# Patient Record
Sex: MALE | URBAN - METROPOLITAN AREA
[De-identification: names, ages, dates, MRNs, and addresses within clinical notes are randomized per-mention and may not be internally consistent; named-entity substitution may affect disease eponyms.]

---

## 2022-09-11 ENCOUNTER — OFFICE VISIT (OUTPATIENT)
Dept: URBAN - METROPOLITAN AREA CLINIC 71 | Facility: CLINIC | Age: 22
End: 2022-09-11
Payer: COMMERCIAL

## 2022-09-11 DIAGNOSIS — T15.01XA FOREIGN BODY IN CORNEA, RIGHT EYE, INITIAL ENCOUNTER: Primary | ICD-10-CM

## 2022-09-11 PROCEDURE — 99204 OFFICE O/P NEW MOD 45 MIN: CPT | Performed by: OPTOMETRIST

## 2022-09-11 RX ORDER — OFLOXACIN 3 MG/ML
0.3 % SOLUTION/ DROPS OPHTHALMIC
Qty: 5 | Refills: 0 | Status: ACTIVE
Start: 2022-09-11

## 2022-09-11 NOTE — IMPRESSION/PLAN
Impression: Foreign body in cornea, right eye, initial encounter: T15.01xA. Metallic FB. Infiltrate. central. Vision affected significantly due to corneal edema/haze. Plan: Pt requested removal. Removed FB and rust ring in Slit lamp with spud and Marcial brush. Instilled 1gtt Altafluor before removal, instilled 1gtt 1% cyclo and 1gtt oflox after FB removal. Pt tolerated procedure well. No complications. Advised that vision will likely be permanently affected due to scarring and large central locale of corneal injury. Discussed topical steroid therapy in the future to reduce scarring, PRK in future if topical steroids fail. Gave pt option of pressure patch, bandage CL, or self patching with ingrid. Pt and parents elected self patching PRN. Pt to call with concerns.

## 2022-09-13 ENCOUNTER — OFFICE VISIT (OUTPATIENT)
Dept: URBAN - METROPOLITAN AREA CLINIC 71 | Facility: CLINIC | Age: 22
End: 2022-09-13
Payer: COMMERCIAL

## 2022-09-13 DIAGNOSIS — T15.01XD FOREIGN BODY IN CORNEA, RIGHT EYE, SUBSEQUENT ENCOUNTER: Primary | ICD-10-CM

## 2022-09-13 PROCEDURE — 99212 OFFICE O/P EST SF 10 MIN: CPT

## 2022-09-13 ASSESSMENT — INTRAOCULAR PRESSURE
OD: 12
OS: 13

## 2022-09-15 ENCOUNTER — OFFICE VISIT (OUTPATIENT)
Dept: URBAN - METROPOLITAN AREA CLINIC 71 | Facility: CLINIC | Age: 22
End: 2022-09-15
Payer: COMMERCIAL

## 2022-09-15 PROCEDURE — 99212 OFFICE O/P EST SF 10 MIN: CPT

## 2022-09-15 ASSESSMENT — INTRAOCULAR PRESSURE
OD: 10
OS: 12

## 2022-09-15 NOTE — IMPRESSION/PLAN
Impression: Foreign body in cornea, right eye, subsequent encounter: T15.01XD. - Metallic FB. Infiltrate. central. Vision improving. Plan: Will have patient continue taking Ofloxacin 6x daily for 2 more day and will follow up on Saturday and potentially start steroid gtts at that time.  Patient voices understanding

## 2022-09-17 ENCOUNTER — OFFICE VISIT (OUTPATIENT)
Dept: URBAN - METROPOLITAN AREA CLINIC 71 | Facility: CLINIC | Age: 22
End: 2022-09-17
Payer: COMMERCIAL

## 2022-09-17 PROCEDURE — 99212 OFFICE O/P EST SF 10 MIN: CPT

## 2022-09-17 RX ORDER — PREDNISOLONE ACETATE 10 MG/ML
1 % SUSPENSION/ DROPS OPHTHALMIC
Qty: 5 | Refills: 0 | Status: ACTIVE
Start: 2022-09-17

## 2022-09-17 ASSESSMENT — INTRAOCULAR PRESSURE
OS: 14
OD: 11

## 2022-09-17 NOTE — IMPRESSION/PLAN
Impression: Foreign body in cornea, right eye, subsequent encounter: T15.01XD. Metallic foreign body removed 4 days ago Pt is showing improvement  Plan: Discussed today's findings with pt Explained that infiltrate and defect is improving but stromal haze starting to opacify. Recommend pt to decrease Ofloxacin 1 gtt BID OD and start Pred-acetate 1 gtt QID OD for haze. Will reevaluate on Monday.

## 2022-09-19 ENCOUNTER — OFFICE VISIT (OUTPATIENT)
Dept: URBAN - METROPOLITAN AREA CLINIC 71 | Facility: CLINIC | Age: 22
End: 2022-09-19
Payer: COMMERCIAL

## 2022-09-19 PROCEDURE — 99212 OFFICE O/P EST SF 10 MIN: CPT

## 2022-09-19 ASSESSMENT — INTRAOCULAR PRESSURE
OD: 9
OS: 12

## 2022-09-19 NOTE — IMPRESSION/PLAN
Impression: Foreign body in cornea, right eye, subsequent encounter: T15.01XD. Metallic foreign body removed 4 days ago Pt is showing improvement Plan: Discussed continuing Ofloxacin 1 gtt BID OD and Pred-acetate 1 gtt QID OD for haze. Will reevaluate on Friday.

## 2022-09-23 ENCOUNTER — OFFICE VISIT (OUTPATIENT)
Dept: URBAN - METROPOLITAN AREA CLINIC 71 | Facility: CLINIC | Age: 22
End: 2022-09-23
Payer: COMMERCIAL

## 2022-09-23 DIAGNOSIS — T15.01XD FOREIGN BODY IN CORNEA, RIGHT EYE, SUBSEQUENT ENCOUNTER: Primary | ICD-10-CM

## 2022-09-23 PROCEDURE — 99213 OFFICE O/P EST LOW 20 MIN: CPT

## 2022-09-23 ASSESSMENT — INTRAOCULAR PRESSURE
OS: 14
OD: 15

## 2022-09-23 NOTE — IMPRESSION/PLAN
Impression: Foreign body in cornea, right eye, subsequent encounter: T15.01XD. Metallic foreign body removed 09/15/22 Pt is showing improvement. Plan: Discussed continuing Ofloxacin 1 gtt BID OD and Pred-acetate 1 gtt QID OD for haze. Will reevaluate in 1 week. Pt to call with any concerns.

## 2022-09-29 ENCOUNTER — OFFICE VISIT (OUTPATIENT)
Dept: URBAN - METROPOLITAN AREA CLINIC 71 | Facility: CLINIC | Age: 22
End: 2022-09-29
Payer: COMMERCIAL

## 2022-09-29 DIAGNOSIS — T15.01XD FOREIGN BODY IN CORNEA, RIGHT EYE, SUBSEQUENT ENCOUNTER: Primary | ICD-10-CM

## 2022-09-29 PROCEDURE — 99212 OFFICE O/P EST SF 10 MIN: CPT

## 2022-09-29 ASSESSMENT — INTRAOCULAR PRESSURE
OD: 19
OS: 19

## 2022-09-29 NOTE — IMPRESSION/PLAN
Impression: Foreign body in cornea, right eye, subsequent encounter: T15.01XD. Plan: Condition has greatly improved. Recommend patient continue Pred-acetate and begin to taper gtts today TID OD for 1 week then BID and then QD. Pt voices understanding. Patient to follow up PRN.